# Patient Record
Sex: MALE | Race: WHITE | NOT HISPANIC OR LATINO | Employment: UNEMPLOYED | RURAL
[De-identification: names, ages, dates, MRNs, and addresses within clinical notes are randomized per-mention and may not be internally consistent; named-entity substitution may affect disease eponyms.]

---

## 2023-11-30 ENCOUNTER — OFFICE VISIT (OUTPATIENT)
Dept: FAMILY MEDICINE CLINIC | Facility: CLINIC | Age: 23
End: 2023-11-30
Payer: COMMERCIAL

## 2023-11-30 VITALS
HEIGHT: 67 IN | WEIGHT: 162.5 LBS | OXYGEN SATURATION: 97 % | BODY MASS INDEX: 25.51 KG/M2 | DIASTOLIC BLOOD PRESSURE: 76 MMHG | HEART RATE: 92 BPM | SYSTOLIC BLOOD PRESSURE: 110 MMHG

## 2023-11-30 DIAGNOSIS — E55.9 VITAMIN D DEFICIENCY: ICD-10-CM

## 2023-11-30 DIAGNOSIS — F51.04 PSYCHOPHYSIOLOGIC INSOMNIA: ICD-10-CM

## 2023-11-30 DIAGNOSIS — K77: ICD-10-CM

## 2023-11-30 DIAGNOSIS — J30.89 PERENNIAL ALLERGIC RHINITIS: ICD-10-CM

## 2023-11-30 DIAGNOSIS — Z28.20 VACCINE REFUSED BY PATIENT: ICD-10-CM

## 2023-11-30 DIAGNOSIS — Z00.01 ENCOUNTER FOR GENERAL ADULT MEDICAL EXAMINATION WITH ABNORMAL FINDINGS: Primary | ICD-10-CM

## 2023-11-30 DIAGNOSIS — K21.9 GASTROESOPHAGEAL REFLUX DISEASE WITHOUT ESOPHAGITIS: ICD-10-CM

## 2023-11-30 DIAGNOSIS — E84.8: ICD-10-CM

## 2023-11-30 DIAGNOSIS — Z79.899 ENCOUNTER FOR LONG-TERM (CURRENT) USE OF OTHER MEDICATIONS: ICD-10-CM

## 2023-11-30 DIAGNOSIS — K86.89 PANCREATIC INSUFFICIENCY DUE TO CYSTIC FIBROSIS: ICD-10-CM

## 2023-11-30 DIAGNOSIS — E84.8 PANCREATIC INSUFFICIENCY DUE TO CYSTIC FIBROSIS: ICD-10-CM

## 2023-11-30 DIAGNOSIS — D22.9 MULTIPLE ATYPICAL NEVI: ICD-10-CM

## 2023-11-30 DIAGNOSIS — E84.9 CYSTIC FIBROSIS: ICD-10-CM

## 2023-11-30 DIAGNOSIS — K59.09 CHRONIC CONSTIPATION: ICD-10-CM

## 2023-11-30 PROCEDURE — 99385 PREV VISIT NEW AGE 18-39: CPT | Performed by: FAMILY MEDICINE

## 2023-11-30 RX ORDER — IVACAFTOR 150 MG/1
150 TABLET, FILM COATED ORAL 2 TIMES DAILY
COMMUNITY

## 2023-11-30 RX ORDER — LEVOCETIRIZINE DIHYDROCHLORIDE 5 MG/1
5 TABLET, FILM COATED ORAL EVERY EVENING
COMMUNITY

## 2023-11-30 RX ORDER — LANSOPRAZOLE 30 MG/1
30 CAPSULE, DELAYED RELEASE ORAL DAILY
COMMUNITY

## 2023-11-30 RX ORDER — MONTELUKAST SODIUM 10 MG/1
10 TABLET ORAL NIGHTLY
COMMUNITY

## 2023-11-30 RX ORDER — AMITRIPTYLINE HYDROCHLORIDE 10 MG/1
10 TABLET, FILM COATED ORAL NIGHTLY
COMMUNITY

## 2023-11-30 NOTE — PROGRESS NOTES
Chief Complaint  Establish Care and Annual Exam    Subjective      Juan Alberto Zhu presents to [facility] PRIMARY [facility]  History of Present Illness  Patient is new to our office, has never been seen here before, so is here to get established and have a physical exam.  He denies any current complaints.  Patient's history was painstakingly reviewed and entered in the chart.  He unfortunately was diagnosed with cystic fibrosis at the age of 6, and therefore has had a lot of the expected medical complications to deal with since that time, but he tries very diligently to manage his health appropriately, and he is typically seen at the [facility].  He did tell me that he anticipated that he would skip his flu vaccine this year, and I explained to him that that was a very bad idea, while it is certainly his choice to take it or decline it, that the benefits far, far exceed any risk, especially in someone with known respiratory disease.  He says whenever he takes a flu vaccine he often feels somewhat ill for a couple of days, and I explained that that was a unfortunate side effect, but that the risks associated with developing influenza with his chronic health problems could be much, much more serious.  He therefore agreed to give this some thought, and if he does change his mind and decide to take the flu vaccine, he can get it here, at any pharmacy, or at the [facility] where he has gotten his other vaccines.  He says all of his other vaccines are up-to-date, but I did take some time to Peruse his medical records this evening, and it looks like he was advised to get a tetanus booster and a Prevnar 20 at his February 2023 checkup at , but declined to do those at that time.  I will therefore encourage the patient to have those vaccines done as well.  The computer system states that in his care gaps, that he has never had a test for hepatitis C, but I believe this is more than  "likely an error, since he has a known history of cystic fibrosis related liver disease, and almost certainly has had testing for that at --but will need to review his records to confirm or refute that.  Objective   Vital Signs:   Vitals:    11/30/23 1533   BP: 110/76   BP Location: Left arm   Patient Position: Sitting   Cuff Size: Adult   Pulse: 92   SpO2: 97%   Weight: 73.7 kg (162 lb 8 oz)   Height: 170.2 cm (67\")      /76 (BP Location: Left arm, Patient Position: Sitting, Cuff Size: Adult)   Pulse 92   Ht 170.2 cm (67\")   Wt 73.7 kg (162 lb 8 oz)   SpO2 97%   BMI 25.45 kg/m²     Body mass index is 25.45 kg/m².    Review of Systems   Constitutional:  Negative for activity change, appetite change, chills, fever, unexpected weight gain and unexpected weight loss.   HENT:  Negative for congestion, ear discharge, ear pain, hearing loss, mouth sores, nosebleeds, rhinorrhea, sinus pressure, sore throat, swollen glands, trouble swallowing and voice change.    Eyes:  Negative for blurred vision, double vision, pain, redness and visual disturbance.   Respiratory:  Positive for shortness of breath (Stable compared with his baseline, nothing new). Negative for cough, chest tightness and wheezing.    Cardiovascular:  Negative for chest pain, palpitations and leg swelling.        PND, orthopnea   Gastrointestinal:  Positive for constipation (This is chronic and has been treatment refractory for the most part) and GERD (Has this, but is currently well-controlled with medication). Negative for abdominal distention, abdominal pain, blood in stool, diarrhea, nausea and vomiting.        Dysphagia, odynophagia   Endocrine: Negative for polydipsia, polyphagia and polyuria.   Genitourinary:  Negative for difficulty urinating, dysuria, frequency, hematuria and urinary incontinence.   Musculoskeletal:  Negative for arthralgias (unusual/atypica), back pain, gait problem, joint swelling and myalgias.   Skin:  Negative for " rash, skin lesions (worrisome/suspicious) and bruise.   Allergic/Immunologic: Positive for environmental allergies. Negative for food allergies.   Neurological:  Negative for dizziness, tremors, seizures, syncope, weakness, light-headedness, numbness, headache and memory problem.   Hematological:  Negative for adenopathy. Does not bruise/bleed easily.   Psychiatric/Behavioral:  Positive for sleep disturbance (Has had insomnia, but this is well-controlled with amitriptyline 10 mg at bedtime). Negative for suicidal ideas and depressed mood. The patient is not nervous/anxious.        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   Surgical History: has a past surgical history that includes Hernia repair (Left) and Tonsillectomy.   Family History: family history includes No Known Problems in his father and mother.   Social History: reports that he has never smoked. He has never used smokeless tobacco. He reports that he does not drink alcohol and does not use drugs.      Current Outpatient Medications:     amitriptyline (ELAVIL) 10 MG tablet, Take 1 tablet by mouth Every Night., Disp: , Rfl:     Ivacaftor (Kalydeco) 150 MG tablet, Take 150 mg by mouth 2 (Two) Times a Day. Take with fatty meal or snack, Disp: , Rfl:     lansoprazole (PREVACID) 30 MG capsule, Take 1 capsule by mouth Daily., Disp: , Rfl:     levocetirizine (XYZAL) 5 MG tablet, Take 1 tablet by mouth Every Evening., Disp: , Rfl:     montelukast (SINGULAIR) 10 MG tablet, Take 1 tablet by mouth Every Night., Disp: , Rfl:     Allergies: Benzalkonium chloride    Physical Exam  Constitutional:       General: He is not in acute distress.     Appearance: Normal appearance. He is not  ill-appearing, toxic-appearing or diaphoretic.   HENT:      Head: Normocephalic and atraumatic.      Right Ear: Tympanic membrane, ear canal and external ear normal.      Left Ear: Tympanic membrane, ear canal and external ear normal.      Nose: Nose normal.      Mouth/Throat:      Mouth: Mucous membranes are moist.      Pharynx: Oropharynx is clear.   Eyes:      General: No scleral icterus.     Extraocular Movements: Extraocular movements intact.      Conjunctiva/sclera: Conjunctivae normal.      Pupils: Pupils are equal, round, and reactive to light.   Neck:      Vascular: No carotid bruit.   Cardiovascular:      Rate and Rhythm: Normal rate and regular rhythm.      Pulses: Normal pulses.      Heart sounds: Normal heart sounds.   Pulmonary:      Effort: Pulmonary effort is normal.      Breath sounds: Normal breath sounds.   Chest:      Chest wall: No tenderness.   Abdominal:      General: Bowel sounds are normal. There is no distension.      Palpations: Abdomen is soft. There is no mass.      Tenderness: There is no abdominal tenderness. There is no guarding or rebound.   Musculoskeletal:         General: No swelling or deformity. Normal range of motion.      Cervical back: Normal range of motion. No rigidity.      Right lower leg: No edema.      Left lower leg: No edema.   Lymphadenopathy:      Cervical: No cervical adenopathy.   Skin:     General: Skin is warm and dry.      Capillary Refill: Capillary refill takes less than 2 seconds.      Coloration: Skin is not cyanotic, jaundiced or pale.      Findings: Lesion (About 8-10 atypical nevi on back, but all look benign) present. No acne, bruising, ecchymosis, erythema, petechiae, rash or wound.      Nails: There is clubbing.      Comments: Extensive clubbing of all fingernails   Neurological:      General: No focal deficit present.      Mental Status: He is alert and oriented to person, place, and time.      Cranial Nerves: No cranial nerve deficit, dysarthria or  facial asymmetry.      Motor: No weakness, tremor or atrophy.      Coordination: Coordination normal.      Gait: Gait normal.      Deep Tendon Reflexes: Reflexes normal.   Psychiatric:         Attention and Perception: Attention and perception normal.         Mood and Affect: Mood and affect normal.         Speech: Speech normal.         Behavior: Behavior normal.         Thought Content: Thought content normal.         Cognition and Memory: Cognition and memory normal.         Judgment: Judgment normal.                   Assessment and Plan   Diagnoses and all orders for this visit:    1. Encounter for general adult medical examination with abnormal findings (Primary)  History was reviewed at length and chart was updated.  Patient will continue eating a healthy diet and exercising regularly.  Staying up-to-date with all vaccinations was strongly encouraged.  He declines to get a flu vaccine at this time, and hopefully he will change his mind and get this in the near future.  According to his records from [facility], he is also due for a tetanus booster and a Prevnar 20, and he will be encouraged to have those done as well.  He will continue current medications as advised by the doctors and healthcare providers at the [facility], who have been providing excellent care for him over the years.  He says he has extensive laboratory work done there regularly and therefore is not due for any labs by us today.  I requested that he come back annually for a physical, and we will be glad to see him at other times on an as-needed basis.  I did explain to the patient that a significant percentage of patients with chronic medical conditions like his will suffer some degree of clinical depression or anxiety during their lifetime, and if he begins to experience anything like that, then he should certainly talk with us about treatment.  Fortunately, he adamantly denies any of that right now, and seems to be  fine, and he does sleep well at night with the amitriptyline 10 mg that he has been prescribed.  2. Cystic fibrosis  See details above  3. Gastroesophageal reflux disease without esophagitis    4. Chronic constipation  The patient was not seeking treatment for this, but I just noted it historically, and reviewed his records.  It sounds like the patient has been treated with everything that is feasible, and usually does fairly well with MiraLAX on a regular basis, and nothing else has worked any better than that, including Linzess.  5. Pancreatic insufficiency due to cystic fibrosis  Patient says he does intermittently take Creon as advised/as prescribed  6. 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  He did not report taking any medication for this currently, but the records from  mention that he has been prescribed Actigall, so it is not clear if he is taking this or not (he may have forgotten, and I did not see this until reviewing his chart this evening)  7. Vitamin D deficiency  This has been well treated by his healthcare providers at [facility]  8. Perennial allergic rhinitis    9. Psychophysiologic insomnia  See above  10. Encounter for long-term (current) use of other medications    11. Vaccine refused by patient  See details above, patient says he is reluctant to take the flu vaccine because previously having adverse side effects for couple of days.  Hopefully he will change his mind, but I explained that this is his choice and we will honor his wishes.  12. Multiple atypical nevi  Patient says he did go to dermatology earlier this year and was advised that none of these appear to need any treatment or biopsies.  They are scattered on his back, the largest of which is about 8 mm, ball of the look perfectly symmetric, smooth,  well-circumscribed, and of benign homogeneous coloration, other than a few that have a slight halo around a central medium brownish-gray homogeneous core.  He was advised to seek attention if any of these begin to change, or if any new worrisome lesions develop.          Follow Up   No follow-ups on file.  Patient was given instructions and counseling regarding his condition or for health maintenance advice. Please see specific information pulled into the AVS if appropriate.     Vinny Anderson MD